# Patient Record
Sex: FEMALE | URBAN - METROPOLITAN AREA
[De-identification: names, ages, dates, MRNs, and addresses within clinical notes are randomized per-mention and may not be internally consistent; named-entity substitution may affect disease eponyms.]

---

## 2024-05-28 ENCOUNTER — OFFICE VISIT (OUTPATIENT)
Dept: URBAN - METROPOLITAN AREA CLINIC 113 | Facility: CLINIC | Age: 24
End: 2024-05-28
Payer: COMMERCIAL

## 2024-05-28 ENCOUNTER — DASHBOARD ENCOUNTERS (OUTPATIENT)
Age: 24
End: 2024-05-28

## 2024-05-28 ENCOUNTER — LAB OUTSIDE AN ENCOUNTER (OUTPATIENT)
Dept: URBAN - METROPOLITAN AREA CLINIC 113 | Facility: CLINIC | Age: 24
End: 2024-05-28

## 2024-05-28 VITALS
HEART RATE: 59 BPM | WEIGHT: 193 LBS | SYSTOLIC BLOOD PRESSURE: 110 MMHG | HEIGHT: 66 IN | RESPIRATION RATE: 20 BRPM | DIASTOLIC BLOOD PRESSURE: 80 MMHG | TEMPERATURE: 98.1 F | BODY MASS INDEX: 31.02 KG/M2

## 2024-05-28 DIAGNOSIS — R11.0 NAUSEA: ICD-10-CM

## 2024-05-28 DIAGNOSIS — D64.9 ANEMIA, UNSPECIFIED TYPE: ICD-10-CM

## 2024-05-28 DIAGNOSIS — R10.13 EPIGASTRIC ABDOMINAL PAIN: ICD-10-CM

## 2024-05-28 PROBLEM — 271737000: Status: ACTIVE | Noted: 2024-05-28

## 2024-05-28 PROBLEM — 422587007: Status: ACTIVE | Noted: 2024-05-28

## 2024-05-28 PROCEDURE — 99203 OFFICE O/P NEW LOW 30 MIN: CPT | Performed by: INTERNAL MEDICINE

## 2024-05-28 RX ORDER — SPIRONOLACTONE 100 MG/1
1 TABLET TABLET, FILM COATED ORAL ONCE A DAY
Status: ACTIVE | COMMUNITY

## 2024-05-28 RX ORDER — ESCITALOPRAM OXALATE 5 MG/1
3 TABLETS TABLET, FILM COATED ORAL ONCE A DAY
Status: ACTIVE | COMMUNITY

## 2024-06-20 ENCOUNTER — OFFICE VISIT (OUTPATIENT)
Dept: URBAN - METROPOLITAN AREA SURGERY CENTER 25 | Facility: SURGERY CENTER | Age: 24
End: 2024-06-20

## 2024-07-16 ENCOUNTER — OFFICE VISIT (OUTPATIENT)
Dept: URBAN - METROPOLITAN AREA MEDICAL CENTER 19 | Facility: MEDICAL CENTER | Age: 24
End: 2024-07-16
Payer: COMMERCIAL

## 2024-07-16 DIAGNOSIS — R11.0 AM NAUSEA: ICD-10-CM

## 2024-07-16 DIAGNOSIS — R10.13 ABDOMINAL DISCOMFORT, EPIGASTRIC: ICD-10-CM

## 2024-07-16 PROCEDURE — 43235 EGD DIAGNOSTIC BRUSH WASH: CPT | Performed by: INTERNAL MEDICINE

## 2024-07-25 ENCOUNTER — OFFICE VISIT (OUTPATIENT)
Dept: URBAN - METROPOLITAN AREA CLINIC 113 | Facility: CLINIC | Age: 24
End: 2024-07-25

## 2024-07-25 NOTE — HPI-OTHER HISTORIES
EGD on 7/16/2024 revealed a normal esophagus and Z-line at 37 cm.  The stomach and duodenum were normal.  Abdominal ultrasound of 4/10/2024 revealed a normal liver and gallbladder.  There was a 3.2 mm common bile duct  CT scan of abdomen pelvis with contrast on 4/10/2024 revealed liver steatosis, normal gallbladder, pancreas and spleen.  There is a 2.4 cm circumscribed fat density in the right lower pelvis consistent with ovarian or dermoid cyst.

## 2024-07-25 NOTE — HPI-TODAY'S VISIT:
24-year-old referred from the emergency room and Dr. Jaime Henriquez for evaluation of epigastric pain and nausea.  She states that back in November of last year she began having nausea and epigastric pain that was nonradiating.  She felt weak but no fever.  After going to the gym she vomited once.  She had a workup by Dr. Henriquez which included a HIDA scan that was unremarkable.  She changed her diet was not eating very much just bland food with no caffeine and eventually this all improved in February.  She began having severe epigastric pain sometimes in the left upper quadrant area that was nonradiating and she went to the emergency room at [facility] on 4/10/2024.  She was evaluated there and discharged home.  She was started on Protonix and she used that for about 4 weeks which did improve some of her symptoms.  She now is better but continues on bland food.  When she drank coffee she began having pain in her stomach again.  In addition stress seems to increase flares of her stomach symptoms.  She has 2 bowel movements per day these been fairly regular.  She had a harder stool and had some blood on the toilet paper twice since last year.  There is no rectal pain.  She denies any melena.  She does use Motrin once or twice a month.  She denies any dysphagia but has heartburn 1-2 times per week.  She has had overall some weight loss.  She does have menses and the last one was about 4 weeks ago.  They are usually heavy for the first 2 days and taper off.  Blood work on 4/10/2024 revealed a hemoglobin 11.5, WBC of 10.3 and platelet count of 331,000.  MCV was 85.  Sodium 139 potassium 3.5 BUN 10 creatinine 0.89.  AST 23, ALT 13, alkaline phosphatase 50, total bili 0.7, albumin 4.2.  Lipase of 113.

## 2024-08-30 ENCOUNTER — OFFICE VISIT (OUTPATIENT)
Dept: URBAN - METROPOLITAN AREA CLINIC 113 | Facility: CLINIC | Age: 24
End: 2024-08-30